# Patient Record
(demographics unavailable — no encounter records)

---

## 2024-10-31 NOTE — PHYSICAL EXAM
[Alert] : alert [Well Nourished] : well nourished [No Acute Distress] : no acute distress [Well Developed] : well developed [Normal Sclera/Conjunctiva] : normal sclera/conjunctiva [EOMI] : extra ocular movement intact [No Proptosis] : no proptosis [Normal Oropharynx] : the oropharynx was normal [No Respiratory Distress] : no respiratory distress [No Accessory Muscle Use] : no accessory muscle use [Clear to Auscultation] : lungs were clear to auscultation bilaterally [Normal S1, S2] : normal S1 and S2 [Normal Rate] : heart rate was normal [Regular Rhythm] : with a regular rhythm [No Edema] : no peripheral edema [Pedal Pulses Normal] : the pedal pulses are present [Normal Bowel Sounds] : normal bowel sounds [Not Tender] : non-tender [Not Distended] : not distended [Soft] : abdomen soft [Normal Anterior Cervical Nodes] : no anterior cervical lymphadenopathy [Normal Gait] : normal gait [Oriented x3] : oriented to person, place, and time [Acanthosis Nigricans] : no acanthosis nigricans [de-identified] : B/L nodules palpated.

## 2024-10-31 NOTE — ASSESSMENT
[FreeTextEntry1] : NTMNG  tfts normal.  declines compressive sx  FNA  insufficient but she decided to monitor. Repeat US now and will call w results.   HypoT: pt euthyroid clinically and biochemically. Cont  Lt4 75 mcg qd  Prediabetes : A1c 6.1 a little better.   discussed diet and exercise encouraged more exercise .Increase  gym 5x week   HLD: lipids at target.  Cont pravastatin 20 mg qd   Primary hyperparathyroidism : s/p surgery cured biochemically. Serum calcium normal.   All lab results reviewed independently and discussed with patient with extensive discussion.  All questions answered Laboratory tests ordered today Continue other current medications  US slip ordered.

## 2024-11-19 NOTE — DISEASE MANAGEMENT
[Pathological] : TNM Stage: p [0] : 0 [FreeTextEntry4] : DCIS [TTNM] : is [NTNM] : 0 [MTNM] : 0 [de-identified] : 1205hUd [de-identified] : left breast

## 2024-11-19 NOTE — DISEASE MANAGEMENT
[Pathological] : TNM Stage: p [0] : 0 [FreeTextEntry4] : DCIS [TTNM] : is [NTNM] : 0 [MTNM] : 0 [de-identified] : 1977hOn [de-identified] : left breast

## 2024-11-19 NOTE — DATA REVIEWED
[FreeTextEntry1] : Mammogram and US Dec 18 2023  IMPRESSION: No mammographic or sonographic evidence of malignancy.  RECOMMENDATION:  Mammography in 1 year.  BI-RADS 2 - Benign Finding(s)

## 2024-11-19 NOTE — DISEASE MANAGEMENT
[Pathological] : TNM Stage: p [0] : 0 [FreeTextEntry4] : DCIS [TTNM] : is [NTNM] : 0 [MTNM] : 0 [de-identified] : [id number] [de-identified] : left breast

## 2024-11-19 NOTE — HISTORY OF PRESENT ILLNESS
[FreeTextEntry1] : Ms. Palencia, a 75 year-old female is being seen today for routine annual follow-up.  She completed radiation therapy (4,240 cGy) to the left breast for DCIS, on 7/16/13.    Mammogram/sonogram 12/18/2023 IMPRESSION: No mammographic or sonographic evidence of malignancy.  Pt presents today for follow up In good spirits and cooperative in care Denies Left Breast pain, edema, arm edema, nipple discharge and fatigue Next mammo scheduled 12/2024

## 2024-11-19 NOTE — HISTORY OF PRESENT ILLNESS
Enfermedad Respiratoria Viral [Uri, Viral Respiratory Illness, Adult, No Abx]  Usted tiene margot enfermedad respiratoria de las vías superiores provocada por un virus. Esta enfermedad es contagiosa [place] los primeros días. Se transmite por el aire, por la tos o el estornudo de la persona afectada, o por contacto directo con oly persona (si reid toca a la persona enferma y después se toca los ojos, la nariz o la boca). La mayoría de las enfermedades virales mejoran en 7-10 días con reposo y simples kiana caseros; aunque, en ocasiones, la enfermedad puede durar varias semanas. Los antibióticos son ineficaces contra los virus, por lo que generalmente no se recetan para esta enfermedad.    Cuidados En [place]:  1) Si los síntomas son severos, descanse en mccollum casa [place] los primeros 2 ó 3 días. Cuando reanude ozzie actividades, evite cansarse demasiado.  2) Evite exponerse al humo de cigarrillo (suyo o de otras personas).  3) Puede usar Tylenol (acetaminofén) o ibuprofeno (Motrin o Advil) para controlar la fiebre o el dolor muscular y el dolor de ale. (La aspirina no debe usarse nunca en personas menores de 18 años enfermas con fiebre, ya que puede causar daños graves al hígado.)  4) Es posible que tenga poco apetito, por lo que margot dieta ligera es adecuada. Para evitar la deshidratación, michelle entre 6 y 8 vasos de líquido cada día (agua, refrescos, jugos de fruta, té, sopa etc.). La abundancia de líquido ayuda a desprender las secreciones de la nariz y los pulmones.  5) Los medicamentos sin receta para el resfriado no acortarán la duración de la enfermedad, mandie pueden ser útiles para aliviar los siguientes síntomas: tos (Robitussin MD); dolor de garganta (Chloraseptic en comprimidos o spray); congestión nasal y de los senos paranasales (Actifed, Sudafed o Chlortrimeton).  Shalom margot VISITA DE CONTROL a mccollum médico, o según le indiquen, si no se siente mejor [place] la semana próxima.  Busque Prontamente Atención  Médica  si algo de lo siguiente ocurre:  -- Tos con esputo de color (mucosidad) o con kirk.  -- Dolor en el pecho, falta de aire, silbidos o dificultad para respirar.  -- Dolor de ale craig, dolor en la aries, el nick o los oídos.  -- Fiebre superior a los 100.4º F (38.0º C) [place] más de jayme días.  -- Incapacidad de tragar a causa del dolor de garganta.  © 9395-5215 [employer]. [address] [place] [address], [place] [address]. Todos los derechos reservados. Esta información no pretende sustituir la atención médica profesional. Sólo mccollum médico puede diagnosticar y tratar un problema de jaxon.         [FreeTextEntry1] : Ms. Palencia, a 75 year-old female is being seen today for routine annual follow-up.  She completed radiation therapy (4,240 cGy) to the left breast for DCIS, on 7/16/13.    Mammogram/sonogram 12/18/2023 IMPRESSION: No mammographic or sonographic evidence of malignancy.  Pt presents today for follow up In good spirits and cooperative in care Denies Left Breast pain, edema, arm edema, nipple discharge and fatigue Next mammo scheduled 12/2024

## 2024-11-19 NOTE — ASSESSMENT
[No evidence of disease] : No evidence of disease [FreeTextEntry1] : no appreciable treatment related sequelae

## 2024-12-20 NOTE — REASON FOR VISIT
[FreeTextEntry1] : WINTER MERCADO is being seen for arrhythmia/ECG abnormalities, structural heart and valve disease, hyperlipidemia, hypertension and carotid, aortic and peripheral vascular disease.   The patient is a 77-year-old white female who has a history of chronic hypertension, hyperlipidemia, mild carotid plaquing stenosis, who presents back to the office today for general cardiac checkup;  She's been largely asymptomatic for chest pain, shortness of breath, palpitations or dizziness;

## 2024-12-20 NOTE — DISCUSSION/SUMMARY
[FreeTextEntry1] : 1).  Okay to continue current medical regimen;\par  \par  2).  Continue periodic checkups and laboratory blood test with primary care;\par  \par  3).  Moderate physical activity and exercise to continue and encouraged;\par  \par  4).  Return to office within 5 months or as needed\par  \par  \par

## 2024-12-20 NOTE — ASSESSMENT
[FreeTextEntry1] : EKG shows normal sinus bradycardia at a rate of 56; ; nonspecific early J-point ST changes but otherwise no acute changes;  In summary this 77-year-old woman has a history of hypertension which had been controlled on current multiple medical regimen, however, today systolic blood pressure is elevated and patient admits she has been salt loading the last couple of days eating potato chips and snacks.   History of mild carotid plaquing stenosis,  and mild hyperlipidemia and also doing well on current antilipemic therapy.    She has been largely asymptomatic from the cardiac standpoint and exercises regularly without difficulty.  Recent hemorrhoidal bleeding was noted and GI checkup and recommended cutting back or cutting out aspirin which she has been doing;     Plan:  Patient recommended to decrease enteric-coated aspirin to 81 mg only 2-3 times per week; if possible and not causing any adverse effects;  Continue other medications the same; but recommend take extra valsartan/HCTZ 160/12.5 mg today only for elevated systolic BP.  Reminded to pursue a low-sodium diet; and keep an eye on home blood pressure checks in the near future;  Recommend echocardiogram and carotid duplex study prior to next visit;  Follow-up to our office within 6 months or as needed;.

## 2025-05-22 NOTE — ASSESSMENT
[Carbohydrate Consistent Diet] : carbohydrate consistent diet [Exercise/Effect on Glucose] : exercise/effect on glucose [FreeTextEntry1] :  NTMNG  pt euthyroid clinically and biochemically. declines compressive sx  FNA  insufficient but she decided to monitor. US Nov 2024: stable. Will c ont o monitor and repeat in 6 mos    HypoT: pt euthyroid clinically and biochemically. Cont  Lt4 75 mcg qd  Prediabetes : A1c 6 a little better.   discussed diet and exercise encouraged more exercise .Increase gym 5x week   HLD: lipids at target.  Cont pravastatin 20 mg qd   Primary hyperparathyroidism : s/p surgery cured biochemically. Serum calcium normal.   All lab results reviewed independently and discussed with patient with extensive discussion.  All questions answered Laboratory tests ordered today Continue other current medications  US slip ordered.

## 2025-05-22 NOTE — PHYSICAL EXAM
[Alert] : alert [Well Nourished] : well nourished [No Acute Distress] : no acute distress [Well Developed] : well developed [Normal Sclera/Conjunctiva] : normal sclera/conjunctiva [EOMI] : extra ocular movement intact [No Proptosis] : no proptosis [Normal Oropharynx] : the oropharynx was normal [No Respiratory Distress] : no respiratory distress [No Accessory Muscle Use] : no accessory muscle use [Clear to Auscultation] : lungs were clear to auscultation bilaterally [Normal S1, S2] : normal S1 and S2 [Normal Rate] : heart rate was normal [Regular Rhythm] : with a regular rhythm [No Edema] : no peripheral edema [Pedal Pulses Normal] : the pedal pulses are present [Normal Bowel Sounds] : normal bowel sounds [Not Tender] : non-tender [Not Distended] : not distended [Soft] : abdomen soft [Normal Gait] : normal gait [Oriented x3] : oriented to person, place, and time [Acanthosis Nigricans] : no acanthosis nigricans [de-identified] : B/L nodules palpated.

## 2025-06-06 NOTE — REASON FOR VISIT
[FreeTextEntry1] : WINTER MERCADO is being seen for arrhythmia/ECG abnormalities, structural heart and valve disease, hyperlipidemia, hypertension and carotid, aortic and peripheral vascular disease.   The patient is a 77-year-old white female who has a history of chronic hypertension, hyperlipidemia, mild carotid plaquing stenosis, who presents back to the office today for general cardiac checkup and to discuss results of recent cardiovascular testing;  She's been largely asymptomatic for chest pain, shortness of breath, palpitations or dizziness;

## 2025-06-06 NOTE — ASSESSMENT
[FreeTextEntry1] : EKG shows normal sinus bradycardia at a rate of 58; ; nonspecific early J-point ST changes but otherwise no acute changes;  In summary this 77-year-old woman has a history of hypertension which had been controlled on current multiple medical regimen, however, today systolic blood pressure is elevated ; she states it was recently checked at another office and was much better however she does not check it at home and admits that she may be inadvertently salt loading in her diet; .   History of mild carotid plaquing stenosis,  and mild hyperlipidemia and also doing well on current antilipemic therapy.    She has been largely asymptomatic from the cardiac standpoint and exercises regularly without difficulty.  Recent hemorrhoidal bleeding was noted and GI checkup and recommended cutting back or cutting out aspirin which she has been doing;  Today however, patient inquiring about whether she can go back to some of the aspirin she was taking daily which she thought was "helping some of her arthritis issues and protection for her peripheral vascular disease;    Plan:  Patient recommended to decrease enteric-coated aspirin to 81 mg only 2-3 times per week; if possible and not causing any adverse effects;  Continue blood pressure medication the same and reinforced importance of low-sodium diet;  Reminded to pursue a low-sodium diet; and keep an eye on home blood pressure checks in the near future;  Follow-up to our office within 6 months or as needed;.

## 2025-06-06 NOTE — HISTORY OF PRESENT ILLNESS
[FreeTextEntry1] : Patient states she has been active physically doing exercises several times per week as well as a great deal of walking and generally feels well with this.;  Recently came back from [place] with her  and family and states they "did well and felt well";  Patient reports she has been rushing around the last few days getting ready for the holidays and admits she was salt loading the last 24 hours eating potato chips and snacks which could explain her systolic hypertension today in the office (/70)  She also took it upon herself to stop the Potassium 10 mEQ QD supplementation because our office had reduced her HCTZ from 25 mg to 12.5 mg daily at last office visit.  This was not discussed at that time but she thought "it was a good idea";  Carotid duplex study from 5/28/2025 demonstrated bilateral mild heterogeneous plaquing in the range of 20 to 49%; (previously seen small right thyroid nodule again visualized)-essentially unchanged;  Transthoracic echocardiogram from May 28, 2025 shows preserved cardiac chamber sizes with normal systolic function of the left ventricle and normal ejection fraction 60 to 65%.  Normal biatrial dimensions; mild TR, PI, MR; mild focal aortic sclerosis without stenosis;    Transthoracic echocardiogram from November 17, 2022 shows preserved cardiac chamber sizes with normal systolic function of the left ventricle and ejection fraction in the range of 60 to 65%.  There was mild TR, mild to moderate PI, trace MR and no pericardial effusion with mild focal aortic sclerosis;  Carotid duplex study from November 17, 2022 shows normal flow bilaterally.  There was mild calcified and heterogeneous plaquing bilaterally; incidental finding was small right thyroid cystic lesion (patient aware);

## 2025-06-06 NOTE — PHYSICAL EXAM
[Well Developed] : well developed [Well Nourished] : well nourished [No Acute Distress] : no acute distress [Normal Conjunctiva] : normal conjunctiva [No Xanthelasma] : no xanthelasma [Normal Venous Pressure] : normal venous pressure [No Carotid Bruit] : no carotid bruit [Normal S1, S2] : normal S1, S2 [No Murmur] : no murmur [No Rub] : no rub [No Gallop] : no gallop [Clear Lung Fields] : clear lung fields [Good Air Entry] : good air entry [No Respiratory Distress] : no respiratory distress  [Soft] : abdomen soft [Non Tender] : non-tender [No Masses/organomegaly] : no masses/organomegaly [Normal Gait] : normal gait [No Edema] : no edema [No Cyanosis] : no cyanosis [No Clubbing] : no clubbing [No Rash] : no rash [No Skin Lesions] : no skin lesions [Moves all extremities] : moves all extremities [No Focal Deficits] : no focal deficits [Normal Speech] : normal speech [Alert and Oriented] : alert and oriented [Normal memory] : normal memory [de-identified] : Bilateral injected sclera with redness